# Patient Record
(demographics unavailable — no encounter records)

---

## 2024-11-11 NOTE — HISTORY OF PRESENT ILLNESS
[FreeTextEntry1] : Patient presents for a med check [de-identified] : 78 yo wm here for follow up hx afib hyperlipidemia. His urology stopped practicing. he is not longer taking tamsulozin when he is home he goes to the br 2 x night.  but when he visits he does not get up at all  My psa is ok 1.55   my joints are achy. If i walk up a flight of stairs.  he feels it in his knees.  it he doesnt do anything he is fine. He has tightness in his back.  he tried having beet drinks and his bp 100/55 so he stopped the beet juice .The medicines are working just fine.  He has a lot of stress taking care of his wife. He does everything. It is not really getting any worse thankfully.   6/24/24 I hurt my achilles.  but it is getting better . changing the scuffs i wear helped.  Eliquis is $$ i have achy joints. I am getting pain in knees and feet. it is years of doing ups.   10/26/23 He sees  hematology  He saw cardio and had ekg and echo He saw urology and had an us of kidney  no cp sob leg swelling. he is caring for his wife who had dementia , spatial issues balance issues, dizziness poor depth perception.   5/1/23  he had low platelets and saw hematology and he sees cardiology.He had his blood work . Lives in [place]. Comes up to see daughter and grandkids. 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  they might have affected my back.  His ige was high. With regards to allergies.  after he eats dinner  he has to blow his nose.  his food is not spicy, he is vegan. he is having beer with dinner.  ironically  he urinates less frequently at night with beer then with water.  there is nothing consistent with regards to his runny nose , he eats pasta, beans, corn.  with breakfast oatmeal blue berries ..fruits. I don't blow my nose  with breakfast   he is "caring' for his wife who has dimentia. he has to take care of everything and worries what would happen if something were to happen to him.

## 2024-11-11 NOTE — PHYSICAL EXAM
[No Acute Distress] : no acute distress [Well Nourished] : well nourished [Well Developed] : well developed [Well-Appearing] : well-appearing [PERRL] : pupils equal round and reactive to light [Normal Sclera/Conjunctiva] : normal sclera/conjunctiva [EOMI] : extraocular movements intact [Normal Outer Ear/Nose] : the outer ears and nose were normal in appearance [No JVD] : no jugular venous distention [Normal Oropharynx] : the oropharynx was normal [Supple] : supple [No Lymphadenopathy] : no lymphadenopathy [No Respiratory Distress] : no respiratory distress  [Thyroid Normal, No Nodules] : the thyroid was normal and there were no nodules present [No Accessory Muscle Use] : no accessory muscle use [Clear to Auscultation] : lungs were clear to auscultation bilaterally [Normal Rate] : normal rate  [Regular Rhythm] : with a regular rhythm [No Murmur] : no murmur heard [Normal S1, S2] : normal S1 and S2 [No Carotid Bruits] : no carotid bruits [No Abdominal Bruit] : a ~M bruit was not heard ~T in the abdomen [Pedal Pulses Present] : the pedal pulses are present [No Varicosities] : no varicosities [No Palpable Aorta] : no palpable aorta [No Edema] : there was no peripheral edema [No Extremity Clubbing/Cyanosis] : no extremity clubbing/cyanosis [Soft] : abdomen soft [Non Tender] : non-tender [No Masses] : no abdominal mass palpated [Non-distended] : non-distended [No HSM] : no HSM [Normal Bowel Sounds] : normal bowel sounds [Normal Anterior Cervical Nodes] : no anterior cervical lymphadenopathy [Normal Posterior Cervical Nodes] : no posterior cervical lymphadenopathy [No CVA Tenderness] : no CVA  tenderness [No Spinal Tenderness] : no spinal tenderness [Grossly Normal Strength/Tone] : grossly normal strength/tone [Coordination Grossly Intact] : coordination grossly intact [No Rash] : no rash [No Focal Deficits] : no focal deficits [Normal Gait] : normal gait [Deep Tendon Reflexes (DTR)] : deep tendon reflexes were 2+ and symmetric [Normal Affect] : the affect was normal [Normal Insight/Judgement] : insight and judgment were intact [de-identified] : achy knees and achilles tendon right

## 2024-11-11 NOTE — HISTORY OF PRESENT ILLNESS
[FreeTextEntry1] : Patient presents for a med check [de-identified] : 76 yo wm here for follow up hx afib hyperlipidemia. His urology stopped practicing. he is not longer taking tamsulozin when he is home he goes to the br 2 x night.  but when he visits he does not get up at all  My psa is ok 1.55   my joints are achy. If i walk up a flight of stairs.  he feels it in his knees.  it he doesnt do anything he is fine. He has tightness in his back.  he tried having beet drinks and his bp 100/55 so he stopped the beet juice .The medicines are working just fine.  He has a lot of stress taking care of his wife. He does everything. It is not really getting any worse thankfully.   6/24/24 I hurt my achilles.  but it is getting better . changing the scuffs i wear helped.  Eliquis is $$ i have achy joints. I am getting pain in knees and feet. it is years of doing ups.   10/26/23 He sees  hematology  He saw cardio and had ekg and echo He saw urology and had an us of kidney  no cp sob leg swelling. he is caring for his wife who had dementia , spatial issues balance issues, dizziness poor depth perception.   5/1/23  he had low platelets and saw hematology and he sees cardiology.He had his blood work . Lives in [place]. Comes up to see daughter and grandkids. 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  they might have affected my back.  His ige was high. With regards to allergies.  after he eats dinner  he has to blow his nose.  his food is not spicy, he is vegan. he is having beer with dinner.  ironically  he urinates less frequently at night with beer then with water.  there is nothing consistent with regards to his runny nose , he eats pasta, beans, corn.  with breakfast oatmeal blue berries ..fruits. I don't blow my nose  with breakfast   he is "caring' for his wife who has dimentia. he has to take care of everything and worries what would happen if something were to happen to him.

## 2024-11-11 NOTE — HEALTH RISK ASSESSMENT
[1 or 2 (0 pts)] : 1 or 2 (0 points) [Never (0 pts)] : Never (0 points) [No] : In the past 12 months have you used drugs other than those required for medical reasons? No [No falls in past year] : Patient reported no falls in the past year [Little interest or pleasure doing things] : 1) Little interest or pleasure doing things [Feeling down, depressed, or hopeless] : 2) Feeling down, depressed, or hopeless [0] : 2) Feeling down, depressed, or hopeless: Not at all (0) [PHQ-2 Negative - No further assessment needed] : PHQ-2 Negative - No further assessment needed [I have developed a follow-up plan documented below in the note.] : I have developed a follow-up plan documented below in the note. [Never] : Never [de-identified] : cardio [Audit-CScore] : 0 [[id number]] : 0

## 2024-11-11 NOTE — PHYSICAL EXAM
[No Acute Distress] : no acute distress [Well Nourished] : well nourished [Well Developed] : well developed [Well-Appearing] : well-appearing [PERRL] : pupils equal round and reactive to light [Normal Sclera/Conjunctiva] : normal sclera/conjunctiva [EOMI] : extraocular movements intact [Normal Outer Ear/Nose] : the outer ears and nose were normal in appearance [No JVD] : no jugular venous distention [Normal Oropharynx] : the oropharynx was normal [Supple] : supple [No Lymphadenopathy] : no lymphadenopathy [No Respiratory Distress] : no respiratory distress  [Thyroid Normal, No Nodules] : the thyroid was normal and there were no nodules present [Clear to Auscultation] : lungs were clear to auscultation bilaterally [No Accessory Muscle Use] : no accessory muscle use [Normal Rate] : normal rate  [Regular Rhythm] : with a regular rhythm [No Murmur] : no murmur heard [Normal S1, S2] : normal S1 and S2 [No Abdominal Bruit] : a ~M bruit was not heard ~T in the abdomen [No Carotid Bruits] : no carotid bruits [Pedal Pulses Present] : the pedal pulses are present [No Varicosities] : no varicosities [No Palpable Aorta] : no palpable aorta [No Edema] : there was no peripheral edema [No Extremity Clubbing/Cyanosis] : no extremity clubbing/cyanosis [Soft] : abdomen soft [Non Tender] : non-tender [No Masses] : no abdominal mass palpated [Non-distended] : non-distended [Normal Bowel Sounds] : normal bowel sounds [No HSM] : no HSM [Normal Anterior Cervical Nodes] : no anterior cervical lymphadenopathy [Normal Posterior Cervical Nodes] : no posterior cervical lymphadenopathy [No CVA Tenderness] : no CVA  tenderness [No Spinal Tenderness] : no spinal tenderness [Grossly Normal Strength/Tone] : grossly normal strength/tone [Coordination Grossly Intact] : coordination grossly intact [No Rash] : no rash [No Focal Deficits] : no focal deficits [Normal Gait] : normal gait [Deep Tendon Reflexes (DTR)] : deep tendon reflexes were 2+ and symmetric [Normal Insight/Judgement] : insight and judgment were intact [Normal Affect] : the affect was normal [de-identified] : achy knees and achilles tendon right

## 2024-11-11 NOTE — HEALTH RISK ASSESSMENT
[1 or 2 (0 pts)] : 1 or 2 (0 points) [Never (0 pts)] : Never (0 points) [No] : In the past 12 months have you used drugs other than those required for medical reasons? No [No falls in past year] : Patient reported no falls in the past year [Little interest or pleasure doing things] : 1) Little interest or pleasure doing things [Feeling down, depressed, or hopeless] : 2) Feeling down, depressed, or hopeless [PHQ-2 Negative - No further assessment needed] : PHQ-2 Negative - No further assessment needed [0] : 2) Feeling down, depressed, or hopeless: Not at all (0) [I have developed a follow-up plan documented below in the note.] : I have developed a follow-up plan documented below in the note. [Never] : Never [de-identified] : cardio [Audit-CScore] : 0 [[id number]] : 0

## 2024-11-11 NOTE — ASSESSMENT
[FreeTextEntry1] : hx afib, hld. low platelets.  Basic cardiovascular prevention measures are advised including regular exercise, surveillance medical examination, and prudent portion-controlled low fat diet, rich in a variety of vegetables with minimal added sugars, refined starches, and no artificially hydrogenated oils. Discussion and interpretation of previous tests , external notes( labs, radiology, specialist  , patient verbalized understanding.  follow up cardiology  and hematology  I can do anything the urology for now unless he has a change. 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 she is improving but it is a lot of physical and mental stress.

## 2025-04-17 NOTE — PHYSICAL EXAM
[No Acute Distress] : no acute distress [Well Nourished] : well nourished [Well Developed] : well developed [Well-Appearing] : well-appearing [Normal Sclera/Conjunctiva] : normal sclera/conjunctiva [PERRL] : pupils equal round and reactive to light [EOMI] : extraocular movements intact [Normal Outer Ear/Nose] : the outer ears and nose were normal in appearance [Normal Oropharynx] : the oropharynx was normal [No JVD] : no jugular venous distention [No Lymphadenopathy] : no lymphadenopathy [Supple] : supple [Thyroid Normal, No Nodules] : the thyroid was normal and there were no nodules present [No Respiratory Distress] : no respiratory distress  [No Accessory Muscle Use] : no accessory muscle use [Clear to Auscultation] : lungs were clear to auscultation bilaterally [Normal Rate] : normal rate  [Regular Rhythm] : with a regular rhythm [Normal S1, S2] : normal S1 and S2 [No Murmur] : no murmur heard [No Carotid Bruits] : no carotid bruits [No Abdominal Bruit] : a ~M bruit was not heard ~T in the abdomen [No Varicosities] : no varicosities [Pedal Pulses Present] : the pedal pulses are present [No Edema] : there was no peripheral edema [No Palpable Aorta] : no palpable aorta [No Extremity Clubbing/Cyanosis] : no extremity clubbing/cyanosis [Soft] : abdomen soft [Non Tender] : non-tender [Non-distended] : non-distended [No Masses] : no abdominal mass palpated [No HSM] : no HSM [Normal Bowel Sounds] : normal bowel sounds [Normal Posterior Cervical Nodes] : no posterior cervical lymphadenopathy [Normal Anterior Cervical Nodes] : no anterior cervical lymphadenopathy [No CVA Tenderness] : no CVA  tenderness [No Spinal Tenderness] : no spinal tenderness [Grossly Normal Strength/Tone] : grossly normal strength/tone [No Rash] : no rash [Coordination Grossly Intact] : coordination grossly intact [No Focal Deficits] : no focal deficits [Normal Gait] : normal gait [Deep Tendon Reflexes (DTR)] : deep tendon reflexes were 2+ and symmetric [Normal Affect] : the affect was normal [Normal Insight/Judgement] : insight and judgment were intact [de-identified] : achy knees and achilles tendon right

## 2025-04-17 NOTE — HEALTH RISK ASSESSMENT
[1 or 2 (0 pts)] : 1 or 2 (0 points) [Never (0 pts)] : Never (0 points) [No] : In the past 12 months have you used drugs other than those required for medical reasons? No [No falls in past year] : Patient reported no falls in the past year [Little interest or pleasure doing things] : 1) Little interest or pleasure doing things [Feeling down, depressed, or hopeless] : 2) Feeling down, depressed, or hopeless [0] : 2) Feeling down, depressed, or hopeless: Not at all (0) [PHQ-2 Negative - No further assessment needed] : PHQ-2 Negative - No further assessment needed [I have developed a follow-up plan documented below in the note.] : I have developed a follow-up plan documented below in the note. [Never] : Never [de-identified] : cardio hematology  [Audit-CScore] : 0 [[id number]] : 0

## 2025-04-17 NOTE — HISTORY OF PRESENT ILLNESS
[FreeTextEntry1] : Patient presents for a med check [de-identified] : 76 yo wm here for follow up hx afib hyperlipidemia.  he feels fine except in the am he has low bp in the am the cardio told him to take the amlodipine at night. he has  a monitor and stress test when he gets back.  his daughter has a brain aneurysm.  11/11/24     His urology stopped practicing. he is no longer taking tamsulosin when he is home he goes to the  2 x night.  but when he visits he does not get up at all  My psa is ok 1.55   my joints are achy. If i walk up a flight of stairs.  he feels it in his knees.  it he doesnt do anything he is fine. He has tightness in his back.  he tried having beet drinks and his bp 100/55 so he stopped the beet juice .The medicines are working just fine.  He has a lot of stress taking care of his wife. He does everything. It is not really getting any worse thankfully.   6/24/24 I hurt my achilles.  but it is getting better . changing the scuffs i wear helped.  Eliquis is $$ i have achy joints. I am getting pain in knees and feet. it is years of doing ups.   10/26/23 He sees  hematology  He saw cardio and had ekg and echo He saw urology and had an us of kidney  no cp sob leg swelling. he is caring for his wife who had dementia , spatial issues balance issues, dizziness poor depth perception.   5/1/23  he had low platelets and saw hematology and he sees cardiology.He had his blood work . Lives in [place]. Comes up to see daughter and grandkids. 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  they might have affected my back.  His ige was high. With regards to allergies.  after he eats dinner  he has to blow his nose.  his food is not spicy, he is vegan. he is having beer with dinner.  ironically  he urinates less frequently at night with beer then with water.  there is nothing consistent with regards to his runny nose , he eats pasta, beans, corn.  with breakfast oatmeal blue berries ..fruits. I don't blow my nose  with breakfast   he is "caring' for his wife who has dimentia. he has to take care of everything and worries what would happen if something were to happen to him.

## 2025-04-17 NOTE — ASSESSMENT
[FreeTextEntry1] : hx afib, hld. low platelets.  Basic cardiovascular prevention measures are advised including regular exercise, surveillance medical examination, and prudent portion-controlled low fat diet, rich in a variety of vegetables with minimal added sugars, refined starches, and no artificially hydrogenated oils. Discussion and interpretation of previous tests , external notes( labs, radiology, specialist  , patient verbalized understanding.  follow up cardiology  and hematology     renewed amlodipine 5 mg qd 90 renew rosuvastatin 10 mg qd 90  pt is caregiver of wife. she is improving but it is a lot of physical and mental stress.